# Patient Record
(demographics unavailable — no encounter records)

---

## 2024-12-04 NOTE — HISTORY OF PRESENT ILLNESS
[FreeTextEntry1] : This is a 77-year-old male with history of advanced dementia due to Warnicke's encephalopathy, hypertension, prostate cancer as well as resection, severe systolic heart failure EF 22%, who presents for evaluation of oropharyngeal dysphagia.   Since the last visit, the patient has had another admission to the hospital at [facility] for acute hypoxic respiratory failure requiring nitro drip, BiPAP, and CCU admission.  He had a swallow evaluation done in October, which showed silent aspiration and nonoral nutrition was recommended.  Since discharge, the patient has not gone back to [place] is at home with his wife now.  His wife reports that he is doing okay.  He lost weight while in the hospital, but he has been eating at home.  He is on a thickened diet at home, and he is able to tolerate it without any choking or overt aspiration.  He has no abdominal pain reported.  His bowel movements are now normalized, no further constipation.  She tries her best to try to sit patient up right when he eats and swallows, small amounts at a time, and use thick-it as much as possible in his liquids.  10/28/24: WBC 4.7, hgb 10.3, Hct 32.9,  10/29/24: Na 138, K 4.2, Cl 105, bicarb 23, BUN 22, Cr 1.42, glucose 80, Ca 8.7 11/6/24 Iron 58, TIBC 224, 26% saturation  10/14/24 VFSS Recommendations:   Diagnostic Impressions 1. Mild to Moderate Oral Stage for puree, moderately thick liquids, and thin liquids characterized by adequate oral containment, slow/reduced bolus manipulation, slow/prolonged anterior to posterior transfer, premature spillage to the hypopharynx (pyriforms) due to reduced tongue to palate seal for thin liquids, with mild to moderate oral residue noted greater for puree/moderately thick liquids and cleared via Yankauer suction by SLP.  2. Moderate to Severe Pharyngeal Stage Dysphagia for puree, moderately thick liquids, and thin liquids characterized by delayed initiation of the pharyngeal swallow (Bolus head at pyriforms for thin liquids), reduced laryngeal elevation, reduced laryngeal vestibule closure, reduced base of tongue retraction, reduced epiglottic deflection, and reduced pharyngeal contractility. There are moderate to severe pharyngeal clearance deficits greater at the vallecular than pyriforms post primary swallow greater for puree/moderately thick liquids. Patient did not follow verbal cue for reswallow. A liquid wash did not benefit to effectively clear the severity of the pharyngeal residue. There is Aspiration (Silent) during the swallow for thin liquids. There is deep laryngeal penetration to the level of the vocal folds after the swallow for puree/moderately thick liquids exacerbated by the pharyngeal (vallecular) residue. Patient is at heightened risk for aspiration/choking given the severity of the pharyngeal stage deficits. Of note, SLP utilized Yankauer suction to clear the oropharynx and patient's RN informed to perform deep suction upon patient arrival to unit.  Recommended Consistencies 1. NPO with consideration for short term nonoral means of nutrition 2. Aspiration and Reflux Precautions 3. Maintain Adequate Oral Hygiene

## 2024-12-04 NOTE — ASSESSMENT
[FreeTextEntry1] : Impression: 1. Oropharyngeal dysphagia - likely due to patient's underlying advanced dementia  Plan: -Explained to patient's wife in detail the findings of the modified barium swallow in the hospital.  Made her aware that patient is at risk for aspiration given aspiration with thin liquids, and that he has increased risk of aspiration regardless of whether he has a feeding tube or not -Made wife aware that he if gastrostomy is to be considered, patient still has a risk of aspiration from secretions -Patient's wife's mother has had a feeding tube before, and she is familiar with it.  She understands that there is a risk that he may pull it out given advanced dementia, and she does not want patient to have a gastrostomy tube knowing this risk -Discussed literature that we can consider continuation of pleasure feeds as much as possible, and to thicken patient's liquids as much as possible.  With careful feeding and aspiration precautions, patient can potentially do well on pleasure feeds -Asked patient to thicken liquids to honey consistency -Asked patient to sit upright at all times, and not to lay down for an hour after eating, and to consume his diet slowly bite by bite -F/u with cardiology to control heart failure  RTC PRN

## 2024-12-04 NOTE — PHYSICAL EXAM
[Alert] : alert [Normal Voice/Communication] : normal voice/communication [Healthy Appearing] : healthy appearing [No Acute Distress] : no acute distress [Underweight (BMI <= 18.5)] : underweight (BMI <= 18.5) [Sclera] : the sclera and conjunctiva were normal [Hearing Threshold Finger Rub Not [place]] : hearing was normal [Normal Appearance] : the appearance of the neck was normal [No Respiratory Distress] : no respiratory distress [No Acc Muscle Use] : no accessory muscle use [Respiration, Rhythm And Depth] : normal respiratory rhythm and effort [Auscultation Breath Sounds / Voice Sounds] : lungs were clear to auscultation bilaterally [Heart Rate And Rhythm] : heart rate was normal and rhythm regular [Normal S1, S2] : normal S1 and S2 [Bowel Sounds] : normal bowel sounds [Abdomen Tenderness] : non-tender [No Masses] : no abdominal mass palpated [Abdomen Soft] : soft [No CVA Tenderness] : no CVA  tenderness [Abnormal Walk] : normal gait [Normal Color / Pigmentation] : normal skin color and pigmentation [No Focal Deficits] : no focal deficits [de-identified] : A+O x 0

## 2024-12-05 NOTE — REVIEW OF SYSTEMS
[Feeling Fatigued] : feeling fatigued [Confusion] : confusion was observed [Memory Lapses Or Loss] : memory lapses or loss [Negative] : Genitourinary [de-identified] : worsening memory, thought due to Wernicke's

## 2024-12-05 NOTE — HISTORY OF PRESENT ILLNESS
[FreeTextEntry1] : 76 yo man presents for follow-up appointment for management of heart failure.  Sathya, son (cell [phone]). Presents with wife/aide today; wife (cell [phone]).  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   In January 2024, presented with progressive weakness, progressive memory loss and became altered with an acute episode of anxiety/disorientation leading him to jump outside a first floor window. In ED, pt was given 500 CC NS bolus, put on 2L NC, but still remained in respiratory distress and was placed on BIPAP. Pt was found to have hyperkalemia, and was given insulin w/ D50, Ca gluc, lokelma 10, and albuterol 10. troponin was elevated, pro BNP showed volume overload, CXR showed pulmonary edema, CTC showed bl pleural effusion, EKG was unchanged; received lasix 20mg+40 mg. TTE showed left ventricular systolic function is severely decreased with an ejection fraction of 28%. Pt was loaded and started on daily ASA, and started on IV Lasix, then converted to PO Lasix. Pt remained on BiPAP initially on admission for ADHF with congested lungs. Patient received a 5-day course of Zosyn for pneumonia. Etiology of heart failure unclear by notes; ischemic evaluation deferred. Treated medically.  Echo 1/2024:  1. Left ventricular cavity is mildly dilated. Left ventricular wall thickness is normal. Left ventricular systolic function is severely decreased with an ejection fraction of 28 % by Herrmann's method of disks. Global left ventricular hypokinesis.  2. Normal right ventricular cavity size and normal systolic function.  3. Structurally normal mitral valve with normal leaflet excursion. There is calcification of the mitral valve annulus. There is trace mitral regurgitation.  4. No prior echocardiogram is available for comparison.  At his last visit with me June 2024, the following issues were discussed: Benign hypertension (401.1) (I10) BP stable at today's visit. Continue with bblocker. Cardiomyopathy, unspecified type (425.4) (I42.9) LV dysfunction, ?new vs.chronic. He appears euvolemic, tolerating his present medical     regimen. Son not aware of all medications, but it appears he is only taking a     bblocker at this time. He is not a candidate for advanced therapies or advanced testing.     Again, I suggest a follow-up echocardiogram to see if his LV dysfunction persists (not     done as of yet). I did not add / alter medications today, as we do not     know his LVEF and his son does not know all of his medications. I encouraged his son     to f/u with Dr. Anthony Estrella in [place], as this office if very close to their home. Dementia (294.20) (F03.90) Advanced dementia, perhaps alcoholic, with worsening memory. Neuro follows him as     well. ALFIE (acute kidney injury) (584.9) (N17.9)  Labs (Feb 2024): Hb 9, HCT 28% CMP: creat 1.44, K 3.9, albumin 2.8, LFT's normal chol 190, HDL 38, trig 136,  mg/dL  Labs (April 2024): BUN 23, creat 1.39 A1c 5.5% chol 172, HDL 56, trig 74,  mg/dL  Mr. Steele (via his son, who presents with him today) was admitted to the hospital numerous times over the past few months for ADHF. Treated medically; palliative card consulted. Echo August 2024:   1. Left ventricular systolic function is severely decreased with an ejection fraction of 22 % by Herrmann's method of disks. Global left ventricular hypokinesis.  2. Elevated left ventricular filling pressure.  3. Enlarged right ventricular cavity size and reduced right ventricular systolic function. Tricuspid annular plane systolic excursion (TAPSE) is 1.4 cm (normal >=1.7 cm).  4. Left atrium is mildly dilated.  5. Estimated pulmonary artery systolic pressure is 55 mmHg, consistent with moderate pulmonary hypertension.  6. Structurally normal mitral valve with normal leaflet excursion. There is mild to moderate mitral regurgitation  7. The inferior vena cava is normal in size measuring 1.95 cm in diameter, (normal <2.1cm) with abnormal inspiratory collapse (abnormal <50%) consistent with mildly elevated right atrial pressure ( R 8, range 5-10mmHg).  8. Left pleural effusion noted.  9. No pericardial effusion seen.  Missed last appointment in Sept 2024. Last hospitalized Oct 2024 ([facility]) for volume overload, hypoxic respiratory failure, HF. Last echo 8/1/24: LV dysfunction EF 22%, Repeat TTE 10/15: EF 20-25%. Home hospice, supportive care. He seems stable at home. Eating. Confusion, at baseline. No shortness of breath. BP has been low, at times. Last hospitalized about 3 weeks ago.

## 2024-12-05 NOTE — REVIEW OF SYSTEMS
[Feeling Fatigued] : feeling fatigued [Confusion] : confusion was observed [Memory Lapses Or Loss] : memory lapses or loss [Negative] : Genitourinary [de-identified] : worsening memory, thought due to Wernicke's

## 2024-12-05 NOTE — HISTORY OF PRESENT ILLNESS
[FreeTextEntry1] : 76 yo man presents for follow-up appointment for management of heart failure.  Sathya, son (cell [phone]). Presents with wife/aide today; wife (cell [phone]).  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   In January 2024, presented with progressive weakness, progressive memory loss and became altered with an acute episode of anxiety/disorientation leading him to jump outside a first floor window. In ED, pt was given 500 CC NS bolus, put on 2L NC, but still remained in respiratory distress and was placed on BIPAP. Pt was found to have hyperkalemia, and was given insulin w/ D50, Ca gluc, lokelma 10, and albuterol 10. troponin was elevated, pro BNP showed volume overload, CXR showed pulmonary edema, CTC showed bl pleural effusion, EKG was unchanged; received lasix 20mg+40 mg. TTE showed left ventricular systolic function is severely decreased with an ejection fraction of 28%. Pt was loaded and started on daily ASA, and started on IV Lasix, then converted to PO Lasix. Pt remained on BiPAP initially on admission for ADHF with congested lungs. Patient received a 5-day course of Zosyn for pneumonia. Etiology of heart failure unclear by notes; ischemic evaluation deferred. Treated medically.  Echo 1/2024:  1. Left ventricular cavity is mildly dilated. Left ventricular wall thickness is normal. Left ventricular systolic function is severely decreased with an ejection fraction of 28 % by Herrmann's method of disks. Global left ventricular hypokinesis.  2. Normal right ventricular cavity size and normal systolic function.  3. Structurally normal mitral valve with normal leaflet excursion. There is calcification of the mitral valve annulus. There is trace mitral regurgitation.  4. No prior echocardiogram is available for comparison.  At his last visit with me June 2024, the following issues were discussed: Benign hypertension (401.1) (I10) BP stable at today's visit. Continue with bblocker. Cardiomyopathy, unspecified type (425.4) (I42.9) LV dysfunction, ?new vs.chronic. He appears euvolemic, tolerating his present medical     regimen. Son not aware of all medications, but it appears he is only taking a     bblocker at this time. He is not a candidate for advanced therapies or advanced testing.     Again, I suggest a follow-up echocardiogram to see if his LV dysfunction persists (not     done as of yet). I did not add / alter medications today, as we do not     know his LVEF and his son does not know all of his medications. I encouraged his son     to f/u with Dr. Anthony Estrella in [place], as this office if very close to their home. Dementia (294.20) (F03.90) Advanced dementia, perhaps alcoholic, with worsening memory. Neuro follows him as     well. ALFIE (acute kidney injury) (584.9) (N17.9)  Labs (Feb 2024): Hb 9, HCT 28% CMP: creat 1.44, K 3.9, albumin 2.8, LFT's normal chol 190, HDL 38, trig 136,  mg/dL  Labs (April 2024): BUN 23, creat 1.39 A1c 5.5% chol 172, HDL 56, trig 74,  mg/dL  Mr. Steele (via his son, who presents with him today) was admitted to the hospital numerous times over the past few months for ADHF. Treated medically; palliative card consulted. Echo August 2024:   1. Left ventricular systolic function is severely decreased with an ejection fraction of 22 % by Herrmann's method of disks. Global left ventricular hypokinesis.  2. Elevated left ventricular filling pressure.  3. Enlarged right ventricular cavity size and reduced right ventricular systolic function. Tricuspid annular plane systolic excursion (TAPSE) is 1.4 cm (normal >=1.7 cm).  4. Left atrium is mildly dilated.  5. Estimated pulmonary artery systolic pressure is 55 mmHg, consistent with moderate pulmonary hypertension.  6. Structurally normal mitral valve with normal leaflet excursion. There is mild to moderate mitral regurgitation  7. The inferior vena cava is normal in size measuring 1.95 cm in diameter, (normal <2.1cm) with abnormal inspiratory collapse (abnormal <50%) consistent with mildly elevated right atrial pressure ( R 8, range 5-10mmHg).  8. Left pleural effusion noted.  9. No pericardial effusion seen.  Missed last appointment in Sept 2024. Last hospitalized Oct 2024 ([facility]) for volume overload, hypoxic respiratory failure, HF. Last echo 8/1/24: LV dysfunction EF 22%, Repeat TTE 10/15: EF 20-25%. Home hospice, supportive care. He seems stable at home. Eating. Confusion, at baseline. No shortness of breath. BP has been low, at times. Last hospitalized about 3 weeks ago.

## 2024-12-05 NOTE — PHYSICAL EXAM
[No Acute Distress] : no acute distress [Frail] : frail [Ill-Appearing] : ill-appearing [Cachectic] : cachexia was observed [Normal Conjunctiva] : normal conjunctiva [Normal Venous Pressure] : normal venous pressure [No Carotid Bruit] : no carotid bruit [Normal S1, S2] : normal S1, S2 [No Murmur] : no murmur [No Rub] : no rub [No Gallop] : no gallop [Clear Lung Fields] : clear lung fields [No Respiratory Distress] : no respiratory distress  [Soft] : abdomen soft [Non Tender] : non-tender [No Masses/organomegaly] : no masses/organomegaly [Normal Bowel Sounds] : normal bowel sounds [No Edema] : no edema [No Varicosities] : no varicosities [No Skin Lesions] : no skin lesions [Moves all extremities] : moves all extremities [No Focal Deficits] : no focal deficits [de-identified] : awake/alert; not oriented to place/time/person

## 2024-12-05 NOTE — PHYSICAL EXAM
[No Acute Distress] : no acute distress [Frail] : frail [Ill-Appearing] : ill-appearing [Cachectic] : cachexia was observed [Normal Conjunctiva] : normal conjunctiva [Normal Venous Pressure] : normal venous pressure [No Carotid Bruit] : no carotid bruit [Normal S1, S2] : normal S1, S2 [No Murmur] : no murmur [No Rub] : no rub [No Gallop] : no gallop [Clear Lung Fields] : clear lung fields [No Respiratory Distress] : no respiratory distress  [Soft] : abdomen soft [Non Tender] : non-tender [No Masses/organomegaly] : no masses/organomegaly [Normal Bowel Sounds] : normal bowel sounds [No Edema] : no edema [No Varicosities] : no varicosities [No Skin Lesions] : no skin lesions [Moves all extremities] : moves all extremities [No Focal Deficits] : no focal deficits [de-identified] : awake/alert; not oriented to place/time/person

## 2024-12-26 NOTE — HISTORY OF PRESENT ILLNESS
[FreeTextEntry1] : Follow up visit for chronic medical conditions.  [de-identified] : The patient is a 76y/o M with PMHx of Hep C, HTN, HLD, dementia, prostate ca s/p resection (in remission) who presents today accompanied by his son and HHA for a follow up visit for chronic medical problems.  #Advanced Dementia- AAOx1. patient is poor historian. History obtained from his son. Patient following neurology. no behavioral disturbances reported by son and HHA.  Patient is on home hospice. with 20hrs HHA and RN visit once a week.  comfort feed with regular food but thickened liquids.   # Left wrist swelling and pain- Patient's son reports that the patient has swelling in his left wrist for the past few weeks, and whenever they touch it, he moans in pain. Son denies any trauma; patient is still able to move finger and wrist joint.

## 2024-12-26 NOTE — PHYSICAL EXAM
[Cachectic] : cachexia was observed [No Respiratory Distress] : no respiratory distress  [No Accessory Muscle Use] : no accessory muscle use [Clear to Auscultation] : lungs were clear to auscultation bilaterally [Normal Rate] : normal rate  [Normal S1, S2] : normal S1 and S2 [de-identified] : left wrist noted with mild swelling. no redness, ROM intact

## 2024-12-26 NOTE — ASSESSMENT
[FreeTextEntry1] : The plan of care was discussed with the patient's son in full detail. He verbalized understanding and in agreement with the plan of care.  f/u with Geriatrics.